# Patient Record
Sex: FEMALE | Race: WHITE | NOT HISPANIC OR LATINO | Employment: FULL TIME | URBAN - METROPOLITAN AREA
[De-identification: names, ages, dates, MRNs, and addresses within clinical notes are randomized per-mention and may not be internally consistent; named-entity substitution may affect disease eponyms.]

---

## 2023-12-08 ENCOUNTER — APPOINTMENT (OUTPATIENT)
Dept: ENDOCRINOLOGY | Facility: CLINIC | Age: 28
End: 2023-12-08

## 2023-12-11 ENCOUNTER — APPOINTMENT (OUTPATIENT)
Dept: OBSTETRICS AND GYNECOLOGY | Facility: CLINIC | Age: 28
End: 2023-12-11
Payer: COMMERCIAL

## 2023-12-12 NOTE — PROGRESS NOTES
"Jackson Moore is a 27 y.o. here due to pelvic pain.     Patient states that she was diagnosed last year with PCOS at [facility]. Patient has a hx of irregular periods and reports PCOS + US there in order to make the diagnosis.     Patient reports she has always had cramping with her periods. Previously cramping would mainly come with her periods. For last 6-7 months however cramping is coming 4-5 x a week. Pain lasts for 3 days. She throws up due to the pain. Patient takes tylenol and midol for the pain, that doesn't always help. Patient denies constipation. Patient denies any vaginal itching/burning. Cramping mainly present by umbilicus.     Patient reports a long standing hx of vaginal dryness. This has made sex uncomfortable. She reports decreased dyspareunia when lubrication is used.     GynHx:  Patient's last menstrual period was 2023.     HPV vaccination: No will think about it  Last pap: 11/10/22 WNL   STI testing today: No     OB History          0    Para   0    Term   0       0    AB   0    Living   0         SAB   0    IAB   0    Ectopic   0    Multiple   0    Live Births   0                  Past Medical History:   Diagnosis Date    PCOS (polycystic ovarian syndrome)        Past Surgical History:   Procedure Laterality Date    BREAST SURGERY  2023    breast reduction       Objective   /85   Ht 1.575 m (5' 2\")   Wt 102 kg (224 lb)   LMP 2023 Comment: PCOS  BMI 40.97 kg/m²     Physical Exam  Constitutional:       Appearance: Normal appearance.   Pulmonary:      Effort: Pulmonary effort is normal.   Abdominal:      Palpations: Abdomen is soft. There is no mass.      Tenderness: There is no abdominal tenderness. There is no guarding.   Neurological:      Mental Status: She is alert.   Psychiatric:         Mood and Affect: Mood normal.         Behavior: Behavior normal.         Thought Content: Thought content normal.         Judgment: Judgment normal. "         Problem List Items Addressed This Visit       Generalized abdominal pain    Overview     Always happened with periods, but now much worse for last 6-7 months  Not related to constipation  Denies vaginal symptoms  STI panel today  Pelvic US today   No pain to palpation today  Discussed with patient that given pain is mainly umbilical it might not be GYN in origin             Oligomenorrhea    Overview     Enocuraged patient to consider IUD to help possibly with dysmenorrhea and oligomenorrhea  Discussed concerns for endometrial lining given irregular periods          Vaginal dryness    Overview     Encouraged vaginal moisturizers like coconut oil   Encouraged adequate lubrication when sexually active and that likely she might have to retrain her brain related to pain/sex response           Other Visit Diagnoses       Routine screening for STI (sexually transmitted infection)    -  Primary    Relevant Orders    C. Trachomatis / N. Gonorrhoeae, Amplified Detection    Trichomonas vaginalis, Nucleic Acid Detection    Postmenopausal bleeding        Relevant Orders    US PELVIS TRANSABDOMINAL WITH TRANSVAGINAL          RTC for annual or prn     Mishel Manning, JUDIE

## 2023-12-15 ENCOUNTER — OFFICE VISIT (OUTPATIENT)
Dept: OBSTETRICS AND GYNECOLOGY | Facility: HOSPITAL | Age: 28
End: 2023-12-15
Payer: COMMERCIAL

## 2023-12-15 VITALS
DIASTOLIC BLOOD PRESSURE: 85 MMHG | SYSTOLIC BLOOD PRESSURE: 130 MMHG | HEIGHT: 62 IN | WEIGHT: 224 LBS | BODY MASS INDEX: 41.22 KG/M2

## 2023-12-15 DIAGNOSIS — Z11.3 ROUTINE SCREENING FOR STI (SEXUALLY TRANSMITTED INFECTION): ICD-10-CM

## 2023-12-15 DIAGNOSIS — N91.4 SECONDARY OLIGOMENORRHEA: ICD-10-CM

## 2023-12-15 DIAGNOSIS — R10.84 GENERALIZED ABDOMINAL PAIN: Primary | ICD-10-CM

## 2023-12-15 DIAGNOSIS — N95.0 POSTMENOPAUSAL BLEEDING: ICD-10-CM

## 2023-12-15 DIAGNOSIS — N89.8 VAGINAL DRYNESS: ICD-10-CM

## 2023-12-15 PROBLEM — N91.5 OLIGOMENORRHEA: Status: ACTIVE | Noted: 2023-12-15

## 2023-12-15 PROCEDURE — 87800 DETECT AGNT MULT DNA DIREC: CPT | Performed by: ADVANCED PRACTICE MIDWIFE

## 2023-12-15 PROCEDURE — 99203 OFFICE O/P NEW LOW 30 MIN: CPT | Performed by: ADVANCED PRACTICE MIDWIFE

## 2023-12-15 PROCEDURE — 1036F TOBACCO NON-USER: CPT | Performed by: ADVANCED PRACTICE MIDWIFE

## 2023-12-15 PROCEDURE — 87661 TRICHOMONAS VAGINALIS AMPLIF: CPT | Performed by: ADVANCED PRACTICE MIDWIFE

## 2023-12-15 PROCEDURE — 99213 OFFICE O/P EST LOW 20 MIN: CPT | Performed by: ADVANCED PRACTICE MIDWIFE

## 2023-12-15 RX ORDER — IBUPROFEN 200 MG
200 TABLET ORAL EVERY 6 HOURS
COMMUNITY

## 2023-12-15 ASSESSMENT — PAIN SCALES - GENERAL: PAINLEVEL: 0-NO PAIN

## 2023-12-19 LAB
C TRACH RRNA SPEC QL NAA+PROBE: NEGATIVE
N GONORRHOEA DNA SPEC QL PROBE+SIG AMP: NEGATIVE
T VAGINALIS RRNA SPEC QL NAA+PROBE: NEGATIVE

## 2023-12-22 ENCOUNTER — APPOINTMENT (OUTPATIENT)
Dept: ORTHOPEDIC SURGERY | Facility: CLINIC | Age: 28
End: 2023-12-22
Payer: COMMERCIAL

## 2024-01-15 NOTE — PROGRESS NOTES
Subjective   Patient ID: Jackson Julian is a 28 y.o. female who presents for No chief complaint on file..  HPI  PATIENT WORKS FOR  EMERGENCY PSYCH    BARIATRIC CONSULT  START WT:  228     IDEAL WT:140  START EXCESS: 88  HT: 63 IN    YRS OF OBESITY: 10 +  GREATEST WT LOSS IN LBS: 15 LBS  PROGRAMS FOR WT LOSS IN THE PAST:  CALORIE RESTRICTION/ LOW FAT LOW CALORIE, EATING FRESH FRUITS, ALLIA  Review of Systems     Allergy/Immunologic:          HIV / AIDS No.  Hepatitis A No.  Hepatitis B No.  Hepatitis C No.  Immunosuppressent drugs No.         HEENT:          Headache YES.         CARDIOLOGY:          History of Hyperlipidemia No.  Last stress test N/A.  Last echocardiogram 2023  Chest pain No.  High blood pressure No.  Irregular heart beat has heart murmur.  Known coronary artery disease No.  Pacemaker No.  Palpitations No.         RESPIRATORY:          Hx steroid use No.  ER visits or Hospitalizations for breathing problems No.  Sleep Apnea SNORING, DAYTIME SLEEPINESS.  MENEZES (dyspnea on exertion) YES  Hx of Asthma/COPD No.         GASTROENTEROLOGY:          Peptic ulcer No, Last EGD N/A, Last UGI N/A.  Colonoscopy Last Colonoscopy N/A.  Heartburn YES/ TAKES TUMS         ENDOCRINOLOGY:          Diabetes No.  Thyroid disorder No.         EXTREMITIES:          Varicose Veins No.  Stasis Ulcers No.  Ankle swelling No.  Personal history DVT No.  Personal history PE No.  Personal history of other thrombolic events No.  Family history of VTE No.  Known genetic bleeding or clotting disorder No.         FEMALE REPRODUCTIVE:          Uterine fibroids No.  Ovarian Cyst YES  Infertility No.  Menstrual history PCOS/LAST MENSTRUAL CYCLE 12/01/2023.         UROLOGY:          Kidney disease No.  Kidney stones No.  Previous UTIs No.  Urinary incontinence No.         MUSCULOSKELETAL:          Osteoporosis/Osteopenia No.  Arthritis No.  Joint pain YES.         SKIN:          Hidradenitis No.  Open skin wounds YES.  Rosacea No.   Healing problems No.         PSYCHOLOGY:          Anxiety none.  Depression none.  Eating disorder denies.    Objective   Physical Exam    Assessment/Plan            Anahy Kaye LPN 01/15/24 3:28 PM

## 2024-01-19 ENCOUNTER — OFFICE VISIT (OUTPATIENT)
Dept: SURGERY | Facility: CLINIC | Age: 29
End: 2024-01-19
Payer: COMMERCIAL

## 2024-01-19 ENCOUNTER — APPOINTMENT (OUTPATIENT)
Dept: OBSTETRICS AND GYNECOLOGY | Facility: CLINIC | Age: 29
End: 2024-01-19
Payer: COMMERCIAL

## 2024-01-19 VITALS
DIASTOLIC BLOOD PRESSURE: 74 MMHG | HEART RATE: 101 BPM | HEIGHT: 63 IN | SYSTOLIC BLOOD PRESSURE: 154 MMHG | WEIGHT: 228 LBS | BODY MASS INDEX: 40.4 KG/M2

## 2024-01-19 DIAGNOSIS — E66.01 MORBID OBESITY WITH BMI OF 40.0-44.9, ADULT (MULTI): Primary | ICD-10-CM

## 2024-01-19 DIAGNOSIS — K21.9 GASTROESOPHAGEAL REFLUX DISEASE WITHOUT ESOPHAGITIS: ICD-10-CM

## 2024-01-19 DIAGNOSIS — R40.0 DAYTIME SOMNOLENCE: ICD-10-CM

## 2024-01-19 PROCEDURE — 99205 OFFICE O/P NEW HI 60 MIN: CPT | Performed by: SURGERY

## 2024-01-19 PROCEDURE — 1036F TOBACCO NON-USER: CPT | Performed by: SURGERY

## 2024-01-19 PROCEDURE — 3008F BODY MASS INDEX DOCD: CPT | Performed by: SURGERY

## 2024-01-19 ASSESSMENT — PATIENT HEALTH QUESTIONNAIRE - PHQ9
1. LITTLE INTEREST OR PLEASURE IN DOING THINGS: NOT AT ALL
2. FEELING DOWN, DEPRESSED OR HOPELESS: NOT AT ALL
SUM OF ALL RESPONSES TO PHQ9 QUESTIONS 1 AND 2: 0

## 2024-01-19 ASSESSMENT — COLUMBIA-SUICIDE SEVERITY RATING SCALE - C-SSRS
1. IN THE PAST MONTH, HAVE YOU WISHED YOU WERE DEAD OR WISHED YOU COULD GO TO SLEEP AND NOT WAKE UP?: NO
6. HAVE YOU EVER DONE ANYTHING, STARTED TO DO ANYTHING, OR PREPARED TO DO ANYTHING TO END YOUR LIFE?: NO
2. HAVE YOU ACTUALLY HAD ANY THOUGHTS OF KILLING YOURSELF?: NO

## 2024-01-19 ASSESSMENT — PAIN SCALES - GENERAL: PAINLEVEL: 0-NO PAIN

## 2024-01-19 ASSESSMENT — ENCOUNTER SYMPTOMS
OCCASIONAL FEELINGS OF UNSTEADINESS: 0
LOSS OF SENSATION IN FEET: 0
DEPRESSION: 0

## 2024-01-22 ASSESSMENT — LIFESTYLE VARIABLES
AUDIT-C TOTAL SCORE: 2
HOW OFTEN DO YOU HAVE SIX OR MORE DRINKS ON ONE OCCASION: NEVER
SKIP TO QUESTIONS 9-10: 1
HOW OFTEN DO YOU HAVE A DRINK CONTAINING ALCOHOL: 2-4 TIMES A MONTH
HOW MANY STANDARD DRINKS CONTAINING ALCOHOL DO YOU HAVE ON A TYPICAL DAY: 1 OR 2

## 2024-01-28 PROBLEM — R40.0 DAYTIME SOMNOLENCE: Status: ACTIVE | Noted: 2024-01-28

## 2024-01-28 PROBLEM — E66.01 MORBID OBESITY WITH BMI OF 40.0-44.9, ADULT (MULTI): Status: ACTIVE | Noted: 2024-01-28

## 2024-01-28 PROBLEM — K21.9 GASTROESOPHAGEAL REFLUX DISEASE WITHOUT ESOPHAGITIS: Status: ACTIVE | Noted: 2024-01-28

## 2024-01-31 ENCOUNTER — APPOINTMENT (OUTPATIENT)
Dept: SURGERY | Facility: CLINIC | Age: 29
End: 2024-01-31
Payer: COMMERCIAL

## 2024-01-31 ENCOUNTER — OFFICE VISIT (OUTPATIENT)
Dept: SURGERY | Facility: CLINIC | Age: 29
End: 2024-01-31
Payer: COMMERCIAL

## 2024-01-31 VITALS
RESPIRATION RATE: 16 BRPM | HEIGHT: 63 IN | WEIGHT: 223 LBS | BODY MASS INDEX: 39.51 KG/M2 | HEART RATE: 80 BPM | DIASTOLIC BLOOD PRESSURE: 70 MMHG | SYSTOLIC BLOOD PRESSURE: 137 MMHG

## 2024-01-31 DIAGNOSIS — E61.0 COPPER DEFICIENCY: ICD-10-CM

## 2024-01-31 DIAGNOSIS — D50.8 IRON DEFICIENCY ANEMIA SECONDARY TO INADEQUATE DIETARY IRON INTAKE: ICD-10-CM

## 2024-01-31 DIAGNOSIS — E51.9 THIAMINE DEFICIENCY: ICD-10-CM

## 2024-01-31 DIAGNOSIS — Z71.3 ENCOUNTER FOR NUTRITION EVALUATION PRIOR TO BARIATRIC SURGERY: ICD-10-CM

## 2024-01-31 DIAGNOSIS — E07.9 DISEASE OF THYROID GLAND: ICD-10-CM

## 2024-01-31 DIAGNOSIS — E53.8 VITAMIN B12 DEFICIENCY: ICD-10-CM

## 2024-01-31 DIAGNOSIS — E55.9 VITAMIN D DEFICIENCY: ICD-10-CM

## 2024-01-31 DIAGNOSIS — Z01.818 PRE-OP EVALUATION: ICD-10-CM

## 2024-01-31 DIAGNOSIS — G47.33 OBSTRUCTIVE SLEEP APNEA: ICD-10-CM

## 2024-01-31 DIAGNOSIS — D68.9 COAGULATION DISORDER (MULTI): Primary | ICD-10-CM

## 2024-01-31 DIAGNOSIS — R73.9 HYPERGLYCEMIA: ICD-10-CM

## 2024-01-31 DIAGNOSIS — E63.9 NUTRITIONAL DISORDER: ICD-10-CM

## 2024-01-31 PROCEDURE — 93000 ELECTROCARDIOGRAM COMPLETE: CPT | Performed by: INTERNAL MEDICINE

## 2024-01-31 PROCEDURE — 3008F BODY MASS INDEX DOCD: CPT | Performed by: INTERNAL MEDICINE

## 2024-01-31 PROCEDURE — 99204 OFFICE O/P NEW MOD 45 MIN: CPT | Performed by: INTERNAL MEDICINE

## 2024-01-31 PROCEDURE — 1036F TOBACCO NON-USER: CPT | Performed by: INTERNAL MEDICINE

## 2024-01-31 ASSESSMENT — PATIENT HEALTH QUESTIONNAIRE - PHQ9
1. LITTLE INTEREST OR PLEASURE IN DOING THINGS: NOT AT ALL
SUM OF ALL RESPONSES TO PHQ9 QUESTIONS 1 AND 2: 0
2. FEELING DOWN, DEPRESSED OR HOPELESS: NOT AT ALL

## 2024-01-31 ASSESSMENT — ENCOUNTER SYMPTOMS
DIZZINESS: 0
LOSS OF SENSATION IN FEET: 0
DIARRHEA: 0
NAUSEA: 0
CONSTIPATION: 0
ROS GI COMMENTS: HEARTBURN
APPETITE CHANGE: 0
SORE THROAT: 0
VOMITING: 0
HEADACHES: 0
PALPITATIONS: 0
RHINORRHEA: 0
NERVOUS/ANXIOUS: 0
DIFFICULTY URINATING: 0
FATIGUE: 0
JOINT SWELLING: 0
WEAKNESS: 0
FEVER: 0
SHORTNESS OF BREATH: 0
HEMATURIA: 0
SINUS PAIN: 0
COUGH: 0
ABDOMINAL PAIN: 0
DEPRESSION: 0
CHILLS: 0
FREQUENCY: 0
OCCASIONAL FEELINGS OF UNSTEADINESS: 0
SLEEP DISTURBANCE: 0
ARTHRALGIAS: 0

## 2024-01-31 ASSESSMENT — PAIN SCALES - GENERAL: PAINLEVEL: 0-NO PAIN

## 2024-01-31 NOTE — PROGRESS NOTES
"Subjective   Patient ID: Jackson Julian is a 28 y.o. female who presents for [facility] visit 1. Patient has been trying to lose weight for many years by following different diets like Weight Watchers, lost some weight but did not maintain. Currently has 3-4 meals a day. Breakfast includes leftovers and occ eats breakfast again at the hospital. Lunch consists of cafeteria food. For dinner, cooks at home, soup, roast. Snacks on chips, candy. Drinks water, diet soda, regular soda, gatorade. Regarding exercise, she does cardio or lifts weights for 30 minutes daily before work. Denies personal and family hx of blood clots. Denies previous sleep study. Endorses heartburn that occurs intermittently.    Diagnostics Reviewed:1/2024 EKG NSR  Labs Reviewed:         Review of Systems   Constitutional:  Negative for appetite change, chills, fatigue and fever.   HENT:  Negative for ear pain, rhinorrhea, sinus pain and sore throat.    Eyes:  Negative for visual disturbance.   Respiratory:  Negative for cough and shortness of breath.    Cardiovascular:  Negative for chest pain and palpitations.   Gastrointestinal:  Negative for abdominal pain, constipation, diarrhea, nausea and vomiting.        Heartburn   Genitourinary:  Negative for difficulty urinating, frequency and hematuria.   Musculoskeletal:  Negative for arthralgias and joint swelling.   Skin:  Negative for rash.   Neurological:  Negative for dizziness, weakness and headaches.   Psychiatric/Behavioral:  Negative for sleep disturbance. The patient is not nervous/anxious.        Objective   /70   Pulse 80   Resp 16   Ht 1.6 m (5' 3\")   Wt 101 kg (223 lb)   LMP 01/21/2024   BMI 39.50 kg/m²     Physical Exam  Constitutional:       General: She is not in acute distress.     Appearance: She is obese.   HENT:      Mouth/Throat:      Comments: Dentition WNL  Cardiovascular:      Rate and Rhythm: Normal rate and regular rhythm.      Heart sounds: Normal heart sounds. "   Pulmonary:      Breath sounds: Normal breath sounds.   Abdominal:      Palpations: Abdomen is soft.      Tenderness: There is no abdominal tenderness.   Musculoskeletal:      Right lower leg: No edema.      Left lower leg: No edema.   Lymphadenopathy:      Cervical: No cervical adenopathy.   Skin:     General: Skin is warm.      Findings: No erythema.   Neurological:      Mental Status: She is alert and oriented to person, place, and time.      Gait: Gait is intact.   Psychiatric:         Mood and Affect: Mood normal.         Behavior: Behavior normal.         Assessment/Plan   Diagnoses and all orders for this visit:  Coagulation disorder (CMS/HCC)  -     Home sleep apnea test (HSAT); Future  -     CBC and Auto Differential; Future  -     aPTT; Future  -     Hemoglobin A1C; Future  -     Folate; Future  -     Ferritin; Future  -     Comprehensive Metabolic Panel; Future  -     TSH with reflex to Free T4 if abnormal; Future  -     Lipid Panel; Future  -     Protime-INR; Future  -     Vitamin D 25-Hydroxy,Total (for eval of Vitamin D levels); Future  -     Vitamin B12; Future  -     Parathyroid Hormone, Intact; Future  -     Vitamin B1, Whole Blood; Future  -     Copper, Blood; Future  -     Vitamin A; Future  -     Vitamin E; Future  -     Zinc, Serum or Plasma; Future  -     Iron and TIBC; Future  Pre-op evaluation  -     ECG 12 lead (Clinic Performed)  -     Home sleep apnea test (HSAT); Future  -     CBC and Auto Differential; Future  -     aPTT; Future  -     Hemoglobin A1C; Future  -     Folate; Future  -     Ferritin; Future  -     Comprehensive Metabolic Panel; Future  -     TSH with reflex to Free T4 if abnormal; Future  -     Lipid Panel; Future  -     Protime-INR; Future  -     Vitamin D 25-Hydroxy,Total (for eval of Vitamin D levels); Future  -     Vitamin B12; Future  -     Parathyroid Hormone, Intact; Future  -     Vitamin B1, Whole Blood; Future  -     Copper, Blood; Future  -     Vitamin A;  Future  -     Vitamin E; Future  -     Zinc, Serum or Plasma; Future  -     Iron and TIBC; Future  Copper deficiency  -     Home sleep apnea test (HSAT); Future  -     CBC and Auto Differential; Future  -     aPTT; Future  -     Hemoglobin A1C; Future  -     Folate; Future  -     Ferritin; Future  -     Comprehensive Metabolic Panel; Future  -     TSH with reflex to Free T4 if abnormal; Future  -     Lipid Panel; Future  -     Protime-INR; Future  -     Vitamin D 25-Hydroxy,Total (for eval of Vitamin D levels); Future  -     Vitamin B12; Future  -     Parathyroid Hormone, Intact; Future  -     Vitamin B1, Whole Blood; Future  -     Copper, Blood; Future  -     Vitamin A; Future  -     Vitamin E; Future  -     Zinc, Serum or Plasma; Future  -     Iron and TIBC; Future  Encounter for nutrition evaluation prior to bariatric surgery  -     Home sleep apnea test (HSAT); Future  -     CBC and Auto Differential; Future  -     aPTT; Future  -     Hemoglobin A1C; Future  -     Folate; Future  -     Ferritin; Future  -     Comprehensive Metabolic Panel; Future  -     TSH with reflex to Free T4 if abnormal; Future  -     Lipid Panel; Future  -     Protime-INR; Future  -     Vitamin D 25-Hydroxy,Total (for eval of Vitamin D levels); Future  -     Vitamin B12; Future  -     Parathyroid Hormone, Intact; Future  -     Vitamin B1, Whole Blood; Future  -     Copper, Blood; Future  -     Vitamin A; Future  -     Vitamin E; Future  -     Zinc, Serum or Plasma; Future  -     Iron and TIBC; Future  Hyperglycemia  -     Home sleep apnea test (HSAT); Future  -     CBC and Auto Differential; Future  -     aPTT; Future  -     Hemoglobin A1C; Future  -     Folate; Future  -     Ferritin; Future  -     Comprehensive Metabolic Panel; Future  -     TSH with reflex to Free T4 if abnormal; Future  -     Lipid Panel; Future  -     Protime-INR; Future  -     Vitamin D 25-Hydroxy,Total (for eval of Vitamin D levels); Future  -     Vitamin B12;  Future  -     Parathyroid Hormone, Intact; Future  -     Vitamin B1, Whole Blood; Future  -     Copper, Blood; Future  -     Vitamin A; Future  -     Vitamin E; Future  -     Zinc, Serum or Plasma; Future  -     Iron and TIBC; Future  Disease of thyroid gland  -     Home sleep apnea test (HSAT); Future  -     CBC and Auto Differential; Future  -     aPTT; Future  -     Hemoglobin A1C; Future  -     Folate; Future  -     Ferritin; Future  -     Comprehensive Metabolic Panel; Future  -     TSH with reflex to Free T4 if abnormal; Future  -     Lipid Panel; Future  -     Protime-INR; Future  -     Vitamin D 25-Hydroxy,Total (for eval of Vitamin D levels); Future  -     Vitamin B12; Future  -     Parathyroid Hormone, Intact; Future  -     Vitamin B1, Whole Blood; Future  -     Copper, Blood; Future  -     Vitamin A; Future  -     Vitamin E; Future  -     Zinc, Serum or Plasma; Future  -     Iron and TIBC; Future  Iron deficiency anemia secondary to inadequate dietary iron intake  -     Home sleep apnea test (HSAT); Future  -     CBC and Auto Differential; Future  -     aPTT; Future  -     Hemoglobin A1C; Future  -     Folate; Future  -     Ferritin; Future  -     Comprehensive Metabolic Panel; Future  -     TSH with reflex to Free T4 if abnormal; Future  -     Lipid Panel; Future  -     Protime-INR; Future  -     Vitamin D 25-Hydroxy,Total (for eval of Vitamin D levels); Future  -     Vitamin B12; Future  -     Parathyroid Hormone, Intact; Future  -     Vitamin B1, Whole Blood; Future  -     Copper, Blood; Future  -     Vitamin A; Future  -     Vitamin E; Future  -     Zinc, Serum or Plasma; Future  -     Iron and TIBC; Future  Nutritional disorder  -     Home sleep apnea test (HSAT); Future  -     CBC and Auto Differential; Future  -     aPTT; Future  -     Hemoglobin A1C; Future  -     Folate; Future  -     Ferritin; Future  -     Comprehensive Metabolic Panel; Future  -     TSH with reflex to Free T4 if abnormal;  Future  -     Lipid Panel; Future  -     Protime-INR; Future  -     Vitamin D 25-Hydroxy,Total (for eval of Vitamin D levels); Future  -     Vitamin B12; Future  -     Parathyroid Hormone, Intact; Future  -     Vitamin B1, Whole Blood; Future  -     Copper, Blood; Future  -     Vitamin A; Future  -     Vitamin E; Future  -     Zinc, Serum or Plasma; Future  -     Iron and TIBC; Future  Obstructive sleep apnea  -     Home sleep apnea test (HSAT); Future  -     CBC and Auto Differential; Future  -     aPTT; Future  -     Hemoglobin A1C; Future  -     Folate; Future  -     Ferritin; Future  -     Comprehensive Metabolic Panel; Future  -     TSH with reflex to Free T4 if abnormal; Future  -     Lipid Panel; Future  -     Protime-INR; Future  -     Vitamin D 25-Hydroxy,Total (for eval of Vitamin D levels); Future  -     Vitamin B12; Future  -     Parathyroid Hormone, Intact; Future  -     Vitamin B1, Whole Blood; Future  -     Copper, Blood; Future  -     Vitamin A; Future  -     Vitamin E; Future  -     Zinc, Serum or Plasma; Future  -     Iron and TIBC; Future  Thiamine deficiency  -     Home sleep apnea test (HSAT); Future  -     CBC and Auto Differential; Future  -     aPTT; Future  -     Hemoglobin A1C; Future  -     Folate; Future  -     Ferritin; Future  -     Comprehensive Metabolic Panel; Future  -     TSH with reflex to Free T4 if abnormal; Future  -     Lipid Panel; Future  -     Protime-INR; Future  -     Vitamin D 25-Hydroxy,Total (for eval of Vitamin D levels); Future  -     Vitamin B12; Future  -     Parathyroid Hormone, Intact; Future  -     Vitamin B1, Whole Blood; Future  -     Copper, Blood; Future  -     Vitamin A; Future  -     Vitamin E; Future  -     Zinc, Serum or Plasma; Future  -     Iron and TIBC; Future  Vitamin B12 deficiency  -     Home sleep apnea test (HSAT); Future  -     CBC and Auto Differential; Future  -     aPTT; Future  -     Hemoglobin A1C; Future  -     Folate; Future  -      Ferritin; Future  -     Comprehensive Metabolic Panel; Future  -     TSH with reflex to Free T4 if abnormal; Future  -     Lipid Panel; Future  -     Protime-INR; Future  -     Vitamin D 25-Hydroxy,Total (for eval of Vitamin D levels); Future  -     Vitamin B12; Future  -     Parathyroid Hormone, Intact; Future  -     Vitamin B1, Whole Blood; Future  -     Copper, Blood; Future  -     Vitamin A; Future  -     Vitamin E; Future  -     Zinc, Serum or Plasma; Future  -     Iron and TIBC; Future  Vitamin D deficiency  -     Home sleep apnea test (HSAT); Future  -     CBC and Auto Differential; Future  -     aPTT; Future  -     Hemoglobin A1C; Future  -     Folate; Future  -     Ferritin; Future  -     Comprehensive Metabolic Panel; Future  -     TSH with reflex to Free T4 if abnormal; Future  -     Lipid Panel; Future  -     Protime-INR; Future  -     Vitamin D 25-Hydroxy,Total (for eval of Vitamin D levels); Future  -     Vitamin B12; Future  -     Parathyroid Hormone, Intact; Future  -     Vitamin B1, Whole Blood; Future  -     Copper, Blood; Future  -     Vitamin A; Future  -     Vitamin E; Future  -     Zinc, Serum or Plasma; Future  -     Iron and TIBC; Future        Scribe Attestation  By signing my name below, Aminah ARIZA Scribe   attest that this documentation has been prepared under the direction and in the presence of Suellen Boyd MD.

## 2024-02-02 ENCOUNTER — NUTRITION (OUTPATIENT)
Dept: SURGERY | Facility: CLINIC | Age: 29
End: 2024-02-02

## 2024-02-02 VITALS — WEIGHT: 225 LBS | BODY MASS INDEX: 39.86 KG/M2

## 2024-02-02 NOTE — PROGRESS NOTES
Initial Medical Weight Loss Appointment ([facility] 1)     Starting Weight: 228 lbs   Current Weight: 225 lbs     Diet Experience: Jackson reports that she has tried losing weight in the past, but only loses 4-5 lbs at a time. Weight loss attempts include: weight watchers, Rafael weight loss supplements. She also exercises, this has been consistent for the past 2 years. However, her UBW is around 220-230 lbs.    Pt Seeking: sleeve   Pertinent Co-morbidities: none that she is aware of   Current Daily Intake: 3-4 meals per day   Some mornings will wake up early had have leftovers from the night before   Breakfast-  Typically orders from her work Cafeteria: a bagel and 3 sausage links and 2 cream cheese. Also orders a [employer] refresher daily   Lunch- Whatever they have served in the cafeteria. Jackson reports that she just switched  locations, and current food items may not have as much oil. She has not been ordering burger and fries like she would previously.   Dinner- Varies. Always some kind of protein (chicken, pork, beef) and a vegetable. Jackson reports that her wife prepares dinners.   How many times do you order takeout, fast food and/or go out to eat per week? On average 3X per week will go out for dinner   Snacks: Yes. Snacks throughout the day at work.   Beverages: usually diet soda but sometimes will have regular, gatorade zero, refresher in the morning    Alcohol Intake:   Food Allergies? NKFAS  Food Intolerances? None mentioned   Food Dislikes?  Do you eat when you are not hungry and/or when you feel full?  Do you eat when you are bored/stressed/emotional? Out of Habit snacking    Current Exercise/Exercise Hx: Goes on the treadmill for 30 minutes everyday before work, sometimes will work out more in the gym on her days off   Hours of sleep per night: ~5 hours per night. Has a hard time falling asleep per pt.   Work schedule: Works for  - works 12 hour shifts 4-5 days per week. 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   Who is in the household? Both her and her wife   Who does the cooking/grocery shopping? Jackson's wife does both  Obstacles to weight loss in the past? Jackson reports that she is unable to lose a significant amount of weight, despite attempts   Anything else relative to diet? Jackson reports that a lot of her family members had surgery. Her wife also had Bariatric surgery with Dr. Couch.   What do you want to get out of surgery? To be healthier and to be able to maintain a healthy weight     Estimated ability to achieve goals: good     Today's Lesson: Review of Dr. Couch's lifelong rules, calorie counting, food label reading, promoting feelings of satiety, and energy balance.  Showed Jackson how to use [employer] pam and how to calculate protein of items  Provided a list of meal options   Reviewed ways to incorporate healthy fats at meals   Diet Goals: Practice the lifelong rules  Exercise Recommendations: Gradually work up to 150 minutes of moderate intensity exercise per week.  Behavior Changes: will be assessed every month  Goals for the month:   - continue to have 3 meals per day   - start to have at least 20 grams of protein at each meal   - practice not drinking with meals   - start to be mindful of eating habits     Plan: Will follow up next month. Will continue to monitor pt's weight, dietary & behavior changes.      Marguerite Dickey, MS, RD, LD

## 2024-02-03 RX ORDER — FLUMAZENIL 0.1 MG/ML
0.2 INJECTION INTRAVENOUS ONCE AS NEEDED
OUTPATIENT
Start: 2024-02-03

## 2024-02-03 RX ORDER — ONDANSETRON HYDROCHLORIDE 2 MG/ML
4 INJECTION, SOLUTION INTRAVENOUS ONCE AS NEEDED
OUTPATIENT
Start: 2024-02-03

## 2024-02-03 RX ORDER — NALOXONE HYDROCHLORIDE 0.4 MG/ML
0.2 INJECTION, SOLUTION INTRAMUSCULAR; INTRAVENOUS; SUBCUTANEOUS EVERY 5 MIN PRN
OUTPATIENT
Start: 2024-02-03

## 2024-02-03 RX ORDER — SODIUM CHLORIDE, SODIUM LACTATE, POTASSIUM CHLORIDE, CALCIUM CHLORIDE 600; 310; 30; 20 MG/100ML; MG/100ML; MG/100ML; MG/100ML
20 INJECTION, SOLUTION INTRAVENOUS CONTINUOUS
OUTPATIENT
Start: 2024-02-03

## 2024-02-13 ENCOUNTER — PREP FOR PROCEDURE (OUTPATIENT)
Dept: SURGERY | Facility: CLINIC | Age: 29
End: 2024-02-13

## 2024-02-15 ENCOUNTER — APPOINTMENT (OUTPATIENT)
Dept: GASTROENTEROLOGY | Facility: HOSPITAL | Age: 29
End: 2024-02-15
Payer: COMMERCIAL

## 2024-02-28 ENCOUNTER — TELEMEDICINE (OUTPATIENT)
Dept: SURGERY | Facility: CLINIC | Age: 29
End: 2024-02-28

## 2024-02-28 ENCOUNTER — APPOINTMENT (OUTPATIENT)
Dept: SURGERY | Facility: CLINIC | Age: 29
End: 2024-02-28

## 2024-02-28 VITALS — BODY MASS INDEX: 39.87 KG/M2 | WEIGHT: 225 LBS | HEIGHT: 63 IN

## 2024-02-28 DIAGNOSIS — G47.33 OBSTRUCTIVE SLEEP APNEA: Primary | ICD-10-CM

## 2024-02-28 DIAGNOSIS — Z71.3 ENCOUNTER FOR NUTRITION EVALUATION PRIOR TO BARIATRIC SURGERY: ICD-10-CM

## 2024-02-28 DIAGNOSIS — F33.0 MILD EPISODE OF RECURRENT MAJOR DEPRESSIVE DISORDER (CMS-HCC): ICD-10-CM

## 2024-02-28 DIAGNOSIS — G47.19 EXCESSIVE DAYTIME SLEEPINESS: ICD-10-CM

## 2024-02-28 DIAGNOSIS — E66.01 MORBID OBESITY WITH BMI OF 40.0-44.9, ADULT (MULTI): ICD-10-CM

## 2024-02-28 PROCEDURE — 1036F TOBACCO NON-USER: CPT | Performed by: INTERNAL MEDICINE

## 2024-02-28 PROCEDURE — 3008F BODY MASS INDEX DOCD: CPT | Performed by: INTERNAL MEDICINE

## 2024-02-28 PROCEDURE — 99213 OFFICE O/P EST LOW 20 MIN: CPT | Performed by: INTERNAL MEDICINE

## 2024-02-28 RX ORDER — BUPROPION HYDROCHLORIDE 150 MG/1
150 TABLET ORAL EVERY MORNING
Qty: 90 TABLET | Refills: 1 | Status: SHIPPED | OUTPATIENT
Start: 2024-02-28 | End: 2025-02-27

## 2024-02-28 ASSESSMENT — ENCOUNTER SYMPTOMS
ABDOMINAL PAIN: 0
OCCASIONAL FEELINGS OF UNSTEADINESS: 0
DIARRHEA: 0
ARTHRALGIAS: 0
NAUSEA: 0
DEPRESSION: 0
LOSS OF SENSATION IN FEET: 0
CONSTIPATION: 0
COUGH: 0
DIZZINESS: 0
PALPITATIONS: 0
ROS GI COMMENTS: HEARTBURN
SHORTNESS OF BREATH: 0

## 2024-02-28 ASSESSMENT — PATIENT HEALTH QUESTIONNAIRE - PHQ9
2. FEELING DOWN, DEPRESSED OR HOPELESS: NOT AT ALL
SUM OF ALL RESPONSES TO PHQ9 QUESTIONS 1 AND 2: 0
1. LITTLE INTEREST OR PLEASURE IN DOING THINGS: NOT AT ALL

## 2024-02-28 ASSESSMENT — PAIN SCALES - GENERAL: PAINLEVEL: 0-NO PAIN

## 2024-02-28 NOTE — PROGRESS NOTES
"Subjective   Patient ID: Jackson Julian is a 28 y.o. female who presents on the phone for [facility] visit 2. Currently has 3 meals a day, protein with each meal. Snacks on candy and chips. Drinks only flavored water or shakes. Does not drink with meals. Regarding exercise, she takes a 1 hour spin class daily. Endorses heartburn. She has not heard from sleep lab. Patient is interested in weight loss medication. She is also going through divorce and feels depressed.    Diagnostics Reviewed: 1/2024 EKG NSR.  Labs Reviewed:         Review of Systems   Respiratory:  Negative for cough and shortness of breath.    Cardiovascular:  Negative for chest pain and palpitations.   Gastrointestinal:  Negative for abdominal pain, constipation, diarrhea and nausea.        Heartburn   Musculoskeletal:  Negative for arthralgias.   Neurological:  Negative for dizziness.   Psychiatric/Behavioral:          Depression       Objective   Ht 1.6 m (5' 3\")   Wt 102 kg (225 lb)   LMP 01/21/2024   BMI 39.86 kg/m²     Physical Exam  Neurological:      Mental Status: She is alert and oriented to person, place, and time.   Psychiatric:         Mood and Affect: Mood normal.         Behavior: Behavior normal.         Assessment/Plan   Diagnoses and all orders for this visit:  Excessive daytime sleepiness  -     Home sleep apnea test (HSAT); Future  Obstructive sleep apnea  -     Home sleep apnea test (HSAT); Future  Encounter for nutrition evaluation prior to bariatric surgery  Morbid obesity with BMI of 40.0-44.9, adult (CMS/HCC)  Mild episode of recurrent major depressive disorder (CMS/HCC)  -     buPROPion XL (Wellbutrin XL) 150 mg 24 hr tablet; Take 1 tablet (150 mg) by mouth once daily in the morning. Do not crush, chew, or split.          Scribe Attestation  By signing my name below, IAminah, Yakelin   attest that this documentation has been prepared under the direction and in the presence of Suellen Boyd MD.  "

## 2024-02-29 ENCOUNTER — TELEPHONE (OUTPATIENT)
Dept: SURGERY | Facility: CLINIC | Age: 29
End: 2024-02-29

## 2024-02-29 NOTE — TELEPHONE ENCOUNTER
patient provided phone number for sleep lab to call to check status of the sleep study/ order viewed in [employer].

## 2024-03-06 ENCOUNTER — TELEPHONE (OUTPATIENT)
Dept: SURGERY | Facility: CLINIC | Age: 29
End: 2024-03-06

## 2024-03-26 ENCOUNTER — APPOINTMENT (OUTPATIENT)
Dept: SURGERY | Facility: CLINIC | Age: 29
End: 2024-03-26

## 2024-05-03 ENCOUNTER — TELEPHONE (OUTPATIENT)
Dept: SLEEP MEDICINE | Facility: HOSPITAL | Age: 29
End: 2024-05-03

## 2024-05-03 NOTE — TELEPHONE ENCOUNTER
Dear Suellen Boyd:    Thank you for referring your patient to a  Sleep Testing center for their home sleep apnea test (HSAT).     Your patient recently had an inconclusive HSAT due to user unable to use the recording unit.     American Academy of Sleep Medicine (AASM) Guidelines typically recommend an in-center, overnight sleep test after an inconclusive attempt at an HSAT. However, we can attempt an HSAT one more time if there are special circumstances.     Given that, would you like us to re-attempt the HSAT or would you like to order an in-lab sleep study?    If you would like an in-lab sleep study, please place the order.    If you would like a repeat HSAT, no new order is needed.    Please let us know how you would like us to proceed.    One of our caregivers will reach out to schedule your patient's in-lab sleep study once the order is placed.     Kind regards,  The  Sleep Medicine Team